# Patient Record
Sex: FEMALE | Race: OTHER | HISPANIC OR LATINO | URBAN - METROPOLITAN AREA
[De-identification: names, ages, dates, MRNs, and addresses within clinical notes are randomized per-mention and may not be internally consistent; named-entity substitution may affect disease eponyms.]

---

## 2018-02-22 ENCOUNTER — EMERGENCY (EMERGENCY)
Facility: HOSPITAL | Age: 43
LOS: 1 days | Discharge: HOME | End: 2018-02-22
Attending: EMERGENCY MEDICINE
Payer: MEDICAID

## 2018-02-22 VITALS
WEIGHT: 113.98 LBS | TEMPERATURE: 97 F | HEART RATE: 67 BPM | HEIGHT: 61 IN | DIASTOLIC BLOOD PRESSURE: 76 MMHG | OXYGEN SATURATION: 96 % | RESPIRATION RATE: 20 BRPM | SYSTOLIC BLOOD PRESSURE: 116 MMHG

## 2018-02-22 VITALS
OXYGEN SATURATION: 96 % | DIASTOLIC BLOOD PRESSURE: 60 MMHG | HEART RATE: 70 BPM | SYSTOLIC BLOOD PRESSURE: 108 MMHG | RESPIRATION RATE: 19 BRPM

## 2018-02-22 LAB
APPEARANCE UR: CLEAR — SIGNIFICANT CHANGE UP
BACTERIA # UR AUTO: ABNORMAL /HPF
BILIRUB UR-MCNC: NEGATIVE — SIGNIFICANT CHANGE UP
COLOR SPEC: YELLOW — SIGNIFICANT CHANGE UP
DIFF PNL FLD: NEGATIVE — SIGNIFICANT CHANGE UP
EPI CELLS # UR: ABNORMAL /HPF
GLUCOSE UR QL: NEGATIVE — SIGNIFICANT CHANGE UP
HCG UR QL: NEGATIVE — SIGNIFICANT CHANGE UP
KETONES UR-MCNC: NEGATIVE — SIGNIFICANT CHANGE UP
LEUKOCYTE ESTERASE UR-ACNC: NEGATIVE — SIGNIFICANT CHANGE UP
NITRITE UR-MCNC: NEGATIVE — SIGNIFICANT CHANGE UP
PH UR: 7 — SIGNIFICANT CHANGE UP (ref 5–8)
PROT UR-MCNC: NEGATIVE — SIGNIFICANT CHANGE UP
SP GR SPEC: 1 — LOW (ref 1.01–1.02)
UROBILINOGEN FLD QL: NEGATIVE — SIGNIFICANT CHANGE UP
WBC UR QL: SIGNIFICANT CHANGE UP /HPF (ref 0–5)

## 2018-02-22 PROCEDURE — 99285 EMERGENCY DEPT VISIT HI MDM: CPT

## 2018-02-22 PROCEDURE — 99284 EMERGENCY DEPT VISIT MOD MDM: CPT

## 2018-02-22 PROCEDURE — 82962 GLUCOSE BLOOD TEST: CPT

## 2018-02-22 PROCEDURE — 81001 URINALYSIS AUTO W/SCOPE: CPT

## 2018-02-22 PROCEDURE — 81025 URINE PREGNANCY TEST: CPT

## 2018-02-22 NOTE — ED ADULT NURSE REASSESSMENT NOTE - NS ED NURSE REASSESS COMMENT FT1
received pt sleeping,not in distress,on enhanced supervision,.
2218 - pt able to walk with steady gait,  pt ate sandwich went home but refused to sign her discharge paper dc home with v/s stable

## 2018-02-22 NOTE — ED PROVIDER NOTE - PROGRESS NOTE DETAILS
Pt improved, shows signs of sobriety clinically.  VSS.  UA with no infection.  Advised strict return precautions and PMD f/u.

## 2018-02-22 NOTE — ED ADULT NURSE NOTE - OBJECTIVE STATEMENT
Patient received as alcohol intoxication patient noted agitated poor historian. refuses to change into a yellow gown cn aware.

## 2018-02-22 NOTE — ED ADULT NURSE NOTE - ED STAT RN HANDOFF DETAILS
Pt endorsed to ORIN Nunez. Pending dispo resting comfortably no behavioral problem on enhanced observation
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
## 2018-02-22 NOTE — ED PROVIDER NOTE - MEDICAL DECISION MAKING DETAILS
Pt w/ EtOH intoxication. Will allow time in ED to sober up. Will check UA and CXR. Reassess upon reaching sobriety.

## 2018-05-19 ENCOUNTER — EMERGENCY (EMERGENCY)
Facility: HOSPITAL | Age: 43
LOS: 1 days | Discharge: HOME | End: 2018-05-19
Attending: EMERGENCY MEDICINE
Payer: SELF-PAY

## 2018-05-19 VITALS
OXYGEN SATURATION: 98 % | RESPIRATION RATE: 20 BRPM | SYSTOLIC BLOOD PRESSURE: 117 MMHG | TEMPERATURE: 97 F | DIASTOLIC BLOOD PRESSURE: 67 MMHG | HEART RATE: 64 BPM

## 2018-05-19 PROCEDURE — 71046 X-RAY EXAM CHEST 2 VIEWS: CPT | Mod: 26

## 2018-05-19 PROCEDURE — 71046 X-RAY EXAM CHEST 2 VIEWS: CPT

## 2018-05-19 PROCEDURE — 99285 EMERGENCY DEPT VISIT HI MDM: CPT | Mod: 25

## 2018-05-19 PROCEDURE — 72125 CT NECK SPINE W/O DYE: CPT | Mod: 26

## 2018-05-19 PROCEDURE — 70450 CT HEAD/BRAIN W/O DYE: CPT | Mod: 26

## 2018-05-19 PROCEDURE — 99284 EMERGENCY DEPT VISIT MOD MDM: CPT | Mod: 25

## 2018-05-19 PROCEDURE — 72125 CT NECK SPINE W/O DYE: CPT

## 2018-05-19 PROCEDURE — 70486 CT MAXILLOFACIAL W/O DYE: CPT

## 2018-05-19 PROCEDURE — 70486 CT MAXILLOFACIAL W/O DYE: CPT | Mod: 26

## 2018-05-19 PROCEDURE — 99285 EMERGENCY DEPT VISIT HI MDM: CPT

## 2018-05-19 PROCEDURE — 70450 CT HEAD/BRAIN W/O DYE: CPT

## 2018-05-19 PROCEDURE — 70480 CT ORBIT/EAR/FOSSA W/O DYE: CPT

## 2018-05-19 RX ORDER — AZITHROMYCIN 500 MG/1
500 TABLET, FILM COATED ORAL ONCE
Qty: 0 | Refills: 0 | Status: COMPLETED | OUTPATIENT
Start: 2018-05-19 | End: 2018-05-19

## 2018-05-19 RX ORDER — AZITHROMYCIN 500 MG/1
1 TABLET, FILM COATED ORAL
Qty: 4 | Refills: 0 | OUTPATIENT
Start: 2018-05-19 | End: 2018-05-22

## 2018-05-19 RX ADMIN — AZITHROMYCIN 500 MILLIGRAM(S): 500 TABLET, FILM COATED ORAL at 17:36

## 2018-05-19 NOTE — ED PROVIDER NOTE - NS ED ROS FT
Constitutional: - Fever, - Chills, - Anorexia, - Fatigue  Eyes: - Discharge, - Irritation, - Redness, + Visual changes, + swelling, - Light sensitivity  EARS: - Ear Pain, - Apparent hearing problems  NOSE: - Congestion, - Bloody nose  MOUTH/THROAT: - Vocal Changes, - Drooling, - Sore throat  NECK: - Lumps, - Stiffness, - Pain  CV: - Palpitations, - Chest Pain, - Edema   RESP:  - Cough, - Shortness of Breath, - Dyspnea on Exertion, - Trouble speaking, - Pain with breathing, - Wheezing  GI: - Diarrhea, - Constipation, - Bloody stools, - Nausea, - Vomiting, - Abdominal Pain  : - Dysuria, -Frequency, - Hematuria, - Hesitancy  MSK: - Myalgias, - Arthralgias, - Weakness, - Deformities, - Injuries  SKIN: - Color change, - Rash, + Swelling, + Bruises, + Wounds  NEURO: - Change in behavior, - Dec. Alertness, - Headache, - Dizziness, - Change in speech, - Weakness, - Seizure-like activity

## 2018-05-19 NOTE — ED PROVIDER NOTE - PMH
Alcohol dependence with unspecified alcohol-induced disorder    Anxiety    Asthma    Bipolar illness    Congestive heart failure, unspecified HF chronicity, unspecified heart failure type    Depression    Hypertension, unspecified type
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
## 2018-05-19 NOTE — ED ADULT NURSE NOTE - ED STAT RN HANDOFF DETAILS
patient discharge as per MD order, stable ambulatory in no acute distress metrocard provided to patient.
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
## 2018-05-19 NOTE — ED PROVIDER NOTE - PROGRESS NOTE DETAILS
Reevaluated patient at bedside.  Pt is awake and alert, steady gait. Patient feeling much improved.  Discussed the results of all diagnostic testing in ED and copies of all reports given.   An opportunity to ask questions was given.  Discussed the importance of prompt, close medical follow-up.  Patient will return with any changes, concerns or persistent / worsening symptoms.  Understanding of all instructions verbalized.
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
## 2021-07-22 NOTE — ED PROVIDER NOTE - CONDUCTED A DETAILED DISCUSSION WITH PATIENT AND/OR GUARDIAN REGARDING, MDM
need for outpatient follow-up/return to ED if symptoms worsen, persist or questions arise/radiology results
25.1